# Patient Record
Sex: FEMALE | Race: WHITE | NOT HISPANIC OR LATINO | Employment: OTHER | URBAN - NONMETROPOLITAN AREA
[De-identification: names, ages, dates, MRNs, and addresses within clinical notes are randomized per-mention and may not be internally consistent; named-entity substitution may affect disease eponyms.]

---

## 2023-10-18 DIAGNOSIS — I25.10 CORONARY ARTERY DISEASE INVOLVING NATIVE CORONARY ARTERY OF NATIVE HEART, UNSPECIFIED WHETHER ANGINA PRESENT: ICD-10-CM

## 2023-10-19 PROBLEM — I25.10 CAD (CORONARY ARTERY DISEASE): Status: ACTIVE | Noted: 2023-10-19

## 2023-10-19 PROBLEM — M79.10 MYALGIA: Status: ACTIVE | Noted: 2023-10-19

## 2023-10-19 PROBLEM — I73.9 PVD (PERIPHERAL VASCULAR DISEASE) (CMS-HCC): Status: ACTIVE | Noted: 2023-10-19

## 2023-10-19 PROBLEM — R06.00 DYSPNEA: Status: ACTIVE | Noted: 2023-10-19

## 2023-10-19 PROBLEM — R68.89 ABNORMAL ANKLE BRACHIAL INDEX: Status: ACTIVE | Noted: 2023-10-19

## 2023-10-19 PROBLEM — I10 HYPERTENSION: Status: ACTIVE | Noted: 2023-10-19

## 2023-10-19 PROBLEM — Z86.79 HISTORY OF ISCHEMIC CARDIOMYOPATHY: Status: ACTIVE | Noted: 2023-10-19

## 2023-10-19 PROBLEM — I73.9 CLAUDICATION, INTERMITTENT (CMS-HCC): Status: ACTIVE | Noted: 2023-10-19

## 2023-10-19 PROBLEM — E78.2 MIXED HYPERLIPIDEMIA: Status: ACTIVE | Noted: 2023-10-19

## 2023-10-19 PROBLEM — J44.9 COPD (CHRONIC OBSTRUCTIVE PULMONARY DISEASE) (MULTI): Status: ACTIVE | Noted: 2023-10-19

## 2023-10-19 PROBLEM — I25.2 HISTORY OF ACUTE ANTERIOR WALL MI: Status: ACTIVE | Noted: 2023-10-19

## 2023-10-19 RX ORDER — ATORVASTATIN CALCIUM 80 MG/1
1 TABLET, FILM COATED ORAL NIGHTLY
COMMUNITY
Start: 2021-11-23 | End: 2024-02-20 | Stop reason: SDUPTHER

## 2023-10-19 RX ORDER — SPIRONOLACTONE 25 MG/1
0.5 TABLET ORAL DAILY
COMMUNITY
Start: 2021-11-22 | End: 2023-12-05

## 2023-10-19 RX ORDER — NITROGLYCERIN 0.4 MG/1
0.4 TABLET SUBLINGUAL EVERY 5 MIN PRN
COMMUNITY

## 2023-10-19 RX ORDER — EZETIMIBE 10 MG/1
10 TABLET ORAL DAILY
COMMUNITY
Start: 2023-08-16 | End: 2023-11-21 | Stop reason: SDUPTHER

## 2023-10-19 RX ORDER — NAPROXEN SODIUM 220 MG/1
81 TABLET, FILM COATED ORAL DAILY
COMMUNITY
Start: 2021-11-22

## 2023-10-19 RX ORDER — TIOTROPIUM BROMIDE INHALATION SPRAY 3.12 UG/1
2 SPRAY, METERED RESPIRATORY (INHALATION) DAILY
COMMUNITY
Start: 2023-08-15

## 2023-10-19 RX ORDER — LISINOPRIL 20 MG/1
1 TABLET ORAL DAILY
COMMUNITY
Start: 2021-11-22

## 2023-10-19 RX ORDER — CARVEDILOL 12.5 MG/1
1 TABLET ORAL 2 TIMES DAILY
COMMUNITY
Start: 2021-11-23

## 2023-10-20 RX ORDER — ATORVASTATIN CALCIUM 80 MG/1
80 TABLET, FILM COATED ORAL NIGHTLY
Qty: 90 TABLET | Refills: 3 | Status: SHIPPED | OUTPATIENT
Start: 2023-10-20

## 2023-11-16 DIAGNOSIS — I25.10 CORONARY ARTERY DISEASE INVOLVING NATIVE CORONARY ARTERY OF NATIVE HEART, UNSPECIFIED WHETHER ANGINA PRESENT: ICD-10-CM

## 2023-11-16 DIAGNOSIS — I73.9 PVD (PERIPHERAL VASCULAR DISEASE) (CMS-HCC): ICD-10-CM

## 2023-11-16 DIAGNOSIS — E78.2 MIXED HYPERLIPIDEMIA: ICD-10-CM

## 2023-11-21 RX ORDER — EZETIMIBE 10 MG/1
10 TABLET ORAL DAILY
Qty: 90 TABLET | Refills: 0 | OUTPATIENT
Start: 2023-11-21

## 2023-11-21 RX ORDER — CLOPIDOGREL BISULFATE 75 MG/1
75 TABLET ORAL DAILY
Refills: 0 | OUTPATIENT
Start: 2023-11-21

## 2023-11-30 RX ORDER — EZETIMIBE 10 MG/1
10 TABLET ORAL DAILY
Qty: 90 TABLET | Refills: 3 | Status: SHIPPED | OUTPATIENT
Start: 2023-11-30

## 2023-12-01 DIAGNOSIS — Z86.79 HISTORY OF ISCHEMIC CARDIOMYOPATHY: ICD-10-CM

## 2023-12-05 RX ORDER — SPIRONOLACTONE 25 MG/1
12.5 TABLET ORAL DAILY
Qty: 45 TABLET | Refills: 3 | Status: SHIPPED | OUTPATIENT
Start: 2023-12-05

## 2023-12-12 RX ORDER — CLOPIDOGREL BISULFATE 75 MG/1
75 TABLET ORAL DAILY
Qty: 90 TABLET | Refills: 3 | OUTPATIENT
Start: 2023-12-12

## 2023-12-13 ENCOUNTER — APPOINTMENT (OUTPATIENT)
Dept: CARDIOLOGY | Facility: CLINIC | Age: 68
End: 2023-12-13
Payer: MEDICARE

## 2024-01-17 RX ORDER — FLUTICASONE FUROATE AND VILANTEROL 200; 25 UG/1; UG/1
1 POWDER RESPIRATORY (INHALATION) DAILY
COMMUNITY

## 2024-02-17 LAB
NON-UH HIE CHOL: 127 MG/DL (ref 120–200)
NON-UH HIE HDL: 42 MG/DL
NON-UH HIE LDL DIRECT: 70 MG/DL
NON-UH HIE TRIG: 141 MG/DL
NON-UH HIE VLDL: 28 MG/DL (ref 7–40)

## 2024-02-20 ENCOUNTER — OFFICE VISIT (OUTPATIENT)
Dept: CARDIOLOGY | Facility: CLINIC | Age: 69
End: 2024-02-20
Payer: MEDICARE

## 2024-02-20 VITALS
DIASTOLIC BLOOD PRESSURE: 68 MMHG | BODY MASS INDEX: 23.32 KG/M2 | HEART RATE: 76 BPM | HEIGHT: 65 IN | WEIGHT: 140 LBS | SYSTOLIC BLOOD PRESSURE: 102 MMHG

## 2024-02-20 DIAGNOSIS — J44.9 CHRONIC OBSTRUCTIVE PULMONARY DISEASE, UNSPECIFIED COPD TYPE (MULTI): ICD-10-CM

## 2024-02-20 DIAGNOSIS — I73.9 PVD (PERIPHERAL VASCULAR DISEASE) (CMS-HCC): ICD-10-CM

## 2024-02-20 DIAGNOSIS — E78.2 MIXED HYPERLIPIDEMIA: ICD-10-CM

## 2024-02-20 DIAGNOSIS — Z98.61 S/P PTCA (PERCUTANEOUS TRANSLUMINAL CORONARY ANGIOPLASTY): ICD-10-CM

## 2024-02-20 DIAGNOSIS — Z86.79 HISTORY OF ISCHEMIC CARDIOMYOPATHY: ICD-10-CM

## 2024-02-20 DIAGNOSIS — I25.2 HISTORY OF ACUTE ANTERIOR WALL MI: ICD-10-CM

## 2024-02-20 DIAGNOSIS — I10 PRIMARY HYPERTENSION: ICD-10-CM

## 2024-02-20 DIAGNOSIS — I25.10 CORONARY ARTERY DISEASE INVOLVING NATIVE CORONARY ARTERY OF NATIVE HEART WITHOUT ANGINA PECTORIS: ICD-10-CM

## 2024-02-20 PROCEDURE — 1159F MED LIST DOCD IN RCRD: CPT | Performed by: INTERNAL MEDICINE

## 2024-02-20 PROCEDURE — 3074F SYST BP LT 130 MM HG: CPT | Performed by: INTERNAL MEDICINE

## 2024-02-20 PROCEDURE — 1036F TOBACCO NON-USER: CPT | Performed by: INTERNAL MEDICINE

## 2024-02-20 PROCEDURE — 99213 OFFICE O/P EST LOW 20 MIN: CPT | Performed by: INTERNAL MEDICINE

## 2024-02-20 PROCEDURE — 3078F DIAST BP <80 MM HG: CPT | Performed by: INTERNAL MEDICINE

## 2024-02-20 PROCEDURE — 1160F RVW MEDS BY RX/DR IN RCRD: CPT | Performed by: INTERNAL MEDICINE

## 2024-02-20 ASSESSMENT — ENCOUNTER SYMPTOMS: LIGHT-HEADEDNESS: 1

## 2024-02-20 NOTE — PROGRESS NOTES
"Subjective   Steffanie Duvall is a 68 y.o. female       Chief Complaint    Annual Exam          68-year-old female returns for annual follow-up she is doing well she denies any cardiovascular complaints, hospitalizations, nitrate usage.  She has sporadic claudication symptoms she has no anginal symptomatology and no nitrate usage.    She has a prior history of MI with revascularization of the proximal mid/distal left anterior descending artery with 2 drug-eluting stents at the time with moderately severe LV dysfunction in May 2017, and subsequent right SFA drug-eluting balloon intervention performed by myself.    She remains on appropriate guideline directed medical therapies, she has not had any reassessment of her LV function but is clinically NYHA class I-2/C.    Recommendations, obtain appropriate laboratories and review, follow-up in 1 year         Review of Systems   Cardiovascular:  Positive for chest pain.   Neurological:  Positive for light-headedness.   All other systems reviewed and are negative.           Vitals:    02/20/24 1128 02/20/24 1133   BP: 106/66 102/68   BP Location: Left arm Left arm   Patient Position: Sitting Standing   Pulse: 76 76   Weight: 63.5 kg (140 lb)    Height: 1.651 m (5' 5\")         Objective   Physical Exam  Constitutional:       Appearance: Normal appearance. She is normal weight.   HENT:      Nose: Nose normal.   Neck:      Vascular: No carotid bruit.   Cardiovascular:      Rate and Rhythm: Normal rate.      Pulses: Normal pulses.      Heart sounds: Normal heart sounds.   Pulmonary:      Effort: Pulmonary effort is normal.   Abdominal:      General: Bowel sounds are normal.      Palpations: Abdomen is soft.   Genitourinary:     Rectum: Normal.   Musculoskeletal:         General: Normal range of motion.      Cervical back: Normal range of motion.      Right lower leg: No edema.      Left lower leg: No edema.   Skin:     General: Skin is warm and dry.   Neurological:      " General: No focal deficit present.      Mental Status: She is alert.   Psychiatric:         Mood and Affect: Mood normal.         Behavior: Behavior normal.         Thought Content: Thought content normal.         Judgment: Judgment normal.         Allergies  Patient has no known allergies.     Current Medications    Current Outpatient Medications:     aspirin 81 mg chewable tablet, Chew 1 tablet (81 mg) once daily., Disp: , Rfl:     atorvastatin (Lipitor) 80 mg tablet, TAKE 1 TABLET AT BEDTIME, Disp: 90 tablet, Rfl: 3    carvedilol (Coreg) 12.5 mg tablet, Take 1 tablet (12.5 mg) by mouth 2 times a day., Disp: , Rfl:     ezetimibe (Zetia) 10 mg tablet, Take 1 tablet (10 mg) by mouth once daily., Disp: 90 tablet, Rfl: 3    fluticasone furoate-vilanteroL (Breo Ellipta) 200-25 mcg/dose inhaler, Inhale 1 puff once daily., Disp: , Rfl:     lisinopril 20 mg tablet, Take 1 tablet (20 mg) by mouth once daily., Disp: , Rfl:     nitroglycerin (Nitrostat) 0.4 mg SL tablet, Place 1 tablet (0.4 mg) under the tongue every 5 minutes if needed for chest pain., Disp: , Rfl:     Spiriva Respimat 2.5 mcg/actuation inhaler, Inhale 2 puffs once daily., Disp: , Rfl:     spironolactone (Aldactone) 25 mg tablet, TAKE 1/2 TABLET EVERY DAY, Disp: 45 tablet, Rfl: 3                     Assessment/Plan   1. Coronary artery disease involving native coronary artery of native heart without angina pectoris        2. S/P PTCA (percutaneous transluminal coronary angioplasty)        3. PVD (peripheral vascular disease) (CMS/HCC)        4. Mixed hyperlipidemia        5. Primary hypertension        6. History of acute anterior wall MI        7. History of ischemic cardiomyopathy        8. Chronic obstructive pulmonary disease, unspecified COPD type (CMS/HCC)                 Scribe Attestation  By signing my name below, Reyna VILLEGAS LPN  , Scribe   attest that this documentation has been prepared under the direction and in the presence of David QUIJANO  DO [place].     Provider Attestation - Scribe documentation    All medical record entries made by the Scribe were at my direction and personally dictated by me. I have reviewed the chart and agree that the record accurately reflects my personal performance of the history, physical exam, discussion and plan.

## 2024-09-25 DIAGNOSIS — Z86.79 HISTORY OF ISCHEMIC CARDIOMYOPATHY: ICD-10-CM

## 2024-09-26 DIAGNOSIS — I10 HYPERTENSION, UNSPECIFIED TYPE: ICD-10-CM

## 2024-09-26 RX ORDER — CARVEDILOL 12.5 MG/1
12.5 TABLET ORAL 2 TIMES DAILY
Qty: 180 TABLET | Refills: 3 | Status: CANCELLED | OUTPATIENT
Start: 2024-09-26 | End: 2025-09-26

## 2024-09-26 RX ORDER — SPIRONOLACTONE 25 MG/1
12.5 TABLET ORAL DAILY
Qty: 45 TABLET | Refills: 3 | Status: SHIPPED | OUTPATIENT
Start: 2024-09-26

## 2024-09-27 RX ORDER — LISINOPRIL 20 MG/1
20 TABLET ORAL DAILY
Qty: 90 TABLET | Refills: 3 | Status: SHIPPED | OUTPATIENT
Start: 2024-09-27 | End: 2025-09-27

## 2024-10-19 DIAGNOSIS — I25.10 CORONARY ARTERY DISEASE INVOLVING NATIVE CORONARY ARTERY OF NATIVE HEART, UNSPECIFIED WHETHER ANGINA PRESENT: ICD-10-CM

## 2024-10-19 DIAGNOSIS — E78.2 MIXED HYPERLIPIDEMIA: ICD-10-CM

## 2024-10-21 RX ORDER — ATORVASTATIN CALCIUM 80 MG/1
80 TABLET, FILM COATED ORAL NIGHTLY
Qty: 90 TABLET | Refills: 3 | Status: SHIPPED | OUTPATIENT
Start: 2024-10-21 | End: 2025-10-21

## 2024-12-20 DIAGNOSIS — E78.2 MIXED HYPERLIPIDEMIA: ICD-10-CM

## 2024-12-20 RX ORDER — EZETIMIBE 10 MG/1
10 TABLET ORAL DAILY
Qty: 90 TABLET | Refills: 3 | Status: SHIPPED | OUTPATIENT
Start: 2024-12-20

## 2025-02-20 ENCOUNTER — APPOINTMENT (OUTPATIENT)
Dept: CARDIOLOGY | Facility: CLINIC | Age: 70
End: 2025-02-20
Payer: MEDICARE

## 2025-02-25 ENCOUNTER — TELEPHONE (OUTPATIENT)
Dept: CARDIOLOGY | Facility: CLINIC | Age: 70
End: 2025-02-25
Payer: COMMERCIAL

## 2025-02-25 DIAGNOSIS — I10 PRIMARY HYPERTENSION: ICD-10-CM

## 2025-02-25 DIAGNOSIS — E78.2 MIXED HYPERLIPIDEMIA: ICD-10-CM

## 2025-02-25 NOTE — TELEPHONE ENCOUNTER
Labs ordered at last OV . POV 2025. Labs generated and will need faxed to [facility] once signed. Please advise.    To Dr. David Jarrell, DO

## 2025-02-26 LAB
NON-UH HIE ALANINE AMINOTRANSFERASE:CCNC:PT:SER/PLAS:QN:NO ADDITION OF P-5': 12 INT._UNIT/L (ref 6–46)
NON-UH HIE ANION GAP:SCNC:PT:SER/PLAS:QN:: 10 MEQ/L (ref 6–16)
NON-UH HIE ASPARTATE AMINOTRANSFERASE:CCNC:PT:SER/PLAS:QN:: 15 INT._UNIT/L (ref 5–43)
NON-UH HIE CALCIUM:MCNC:PT:SER/PLAS:QN:: 9.1 MG/DL (ref 8.9–11.1)
NON-UH HIE CARBON DIOXIDE:SCNC:PT:SER/PLAS:QN:: 30 MMOL/L (ref 21–31)
NON-UH HIE CHLORIDE:SCNC:PT:SER/PLAS:QN:: 104 MMOL/L (ref 101–111)
NON-UH HIE CHOLESTEROL.IN HDL:MCNC:PT:SER/PLAS:QN:: 31 MG/DL
NON-UH HIE CHOLESTEROL.IN LDL:MCNC:PT:SER/PLAS:QN:: 34 MG/DL
NON-UH HIE CHOLESTEROL.IN VLDL:MCNC:PT:SER/PLAS:QN:CALCULATED: 22 MG/DL (ref 7–40)
NON-UH HIE CHOLESTEROL:MCNC:PT:SER/PLAS:QN:: 80 MG/DL (ref 120–200)
NON-UH HIE CREATININE:MCNC:PT:SER/PLAS:QN:: 0.4 MG/DL (ref 0.5–1.3)
NON-UH HIE EGFR: 107 ML/MIN/1.73 M2
NON-UH HIE GLUCOSE:MCNC:PT:SER/PLAS:QN:: 113 MG/DL (ref 55–199)
NON-UH HIE POTASSIUM:SCNC:PT:SER/PLAS:QN:: 3.5 MMOL/L (ref 3.5–5.3)
NON-UH HIE SODIUM:SCNC:PT:SER/PLAS:QN:: 140 MMOL/L (ref 135–145)
NON-UH HIE TRIGLYCERIDE:MCNC:PT:SER/PLAS:QN:: 111 MG/DL
NON-UH HIE UREA NITROGEN/CREATININE:MRTO:PT:SER/PLAS:QN:: 48 NO UNITS (ref 10–20)
NON-UH HIE UREA NITROGEN:MCNC:PT:SER/PLAS:QN:: 19 MG/DL (ref 5–21)

## 2025-02-27 ENCOUNTER — APPOINTMENT (OUTPATIENT)
Dept: CARDIOLOGY | Facility: CLINIC | Age: 70
End: 2025-02-27
Payer: MEDICARE

## 2025-02-27 VITALS
BODY MASS INDEX: 19.26 KG/M2 | HEIGHT: 65 IN | HEART RATE: 100 BPM | SYSTOLIC BLOOD PRESSURE: 105 MMHG | WEIGHT: 115.6 LBS | DIASTOLIC BLOOD PRESSURE: 60 MMHG

## 2025-02-27 DIAGNOSIS — I25.2 HISTORY OF ACUTE ANTERIOR WALL MI: ICD-10-CM

## 2025-02-27 DIAGNOSIS — Z98.61 S/P PTCA (PERCUTANEOUS TRANSLUMINAL CORONARY ANGIOPLASTY): ICD-10-CM

## 2025-02-27 DIAGNOSIS — I10 PRIMARY HYPERTENSION: ICD-10-CM

## 2025-02-27 DIAGNOSIS — Z87.891 FORMER SMOKER: ICD-10-CM

## 2025-02-27 DIAGNOSIS — I25.10 CORONARY ARTERY DISEASE INVOLVING NATIVE CORONARY ARTERY OF NATIVE HEART WITHOUT ANGINA PECTORIS: ICD-10-CM

## 2025-02-27 DIAGNOSIS — J44.9 CHRONIC OBSTRUCTIVE PULMONARY DISEASE, UNSPECIFIED COPD TYPE (MULTI): ICD-10-CM

## 2025-02-27 DIAGNOSIS — E78.2 MIXED HYPERLIPIDEMIA: ICD-10-CM

## 2025-02-27 DIAGNOSIS — Z86.79 HISTORY OF ISCHEMIC CARDIOMYOPATHY: ICD-10-CM

## 2025-02-27 DIAGNOSIS — I73.9 PVD (PERIPHERAL VASCULAR DISEASE) (CMS-HCC): ICD-10-CM

## 2025-02-27 DIAGNOSIS — R00.0 SINUS TACHYCARDIA: ICD-10-CM

## 2025-02-27 PROCEDURE — 1160F RVW MEDS BY RX/DR IN RCRD: CPT | Performed by: INTERNAL MEDICINE

## 2025-02-27 PROCEDURE — 3078F DIAST BP <80 MM HG: CPT | Performed by: INTERNAL MEDICINE

## 2025-02-27 PROCEDURE — 3074F SYST BP LT 130 MM HG: CPT | Performed by: INTERNAL MEDICINE

## 2025-02-27 PROCEDURE — 1159F MED LIST DOCD IN RCRD: CPT | Performed by: INTERNAL MEDICINE

## 2025-02-27 PROCEDURE — 1036F TOBACCO NON-USER: CPT | Performed by: INTERNAL MEDICINE

## 2025-02-27 PROCEDURE — 3008F BODY MASS INDEX DOCD: CPT | Performed by: INTERNAL MEDICINE

## 2025-02-27 PROCEDURE — 99213 OFFICE O/P EST LOW 20 MIN: CPT | Performed by: INTERNAL MEDICINE

## 2025-02-27 PROCEDURE — 93000 ELECTROCARDIOGRAM COMPLETE: CPT | Performed by: INTERNAL MEDICINE

## 2025-02-27 RX ORDER — TIRZEPATIDE 5 MG/.5ML
1 INJECTION, SOLUTION SUBCUTANEOUS
COMMUNITY
Start: 2024-10-18

## 2025-02-27 RX ORDER — METFORMIN HYDROCHLORIDE 500 MG/1
6.25 TABLET ORAL
COMMUNITY
Start: 2025-02-16

## 2025-02-27 RX ORDER — TIRZEPATIDE 2.5 MG/.5ML
1 INJECTION, SOLUTION SUBCUTANEOUS
COMMUNITY
Start: 2024-09-18

## 2025-02-27 ASSESSMENT — ENCOUNTER SYMPTOMS: DIZZINESS: 1

## 2025-02-27 NOTE — PROGRESS NOTES
"Subjective   Steffanie Duvall is a 69 y.o. female       Chief Complaint    Annual Exam          69-year-old female returns for follow-up she is doing well she denies any cardiovascular events, complaints or nitrate usage or repeat hospitalizations, edema, syncope.  She is lost approximately 25 pounds in treatment for diabetes on Mounjaro therapy    She has a prior history of MI with revascularization of the proximal mid/distal left anterior descending artery with 2 drug-eluting stents at the time with moderately severe LV dysfunction in May 2017, and subsequent right SFA drug-eluting balloon intervention performed by myself.  She has no complaints of claudication.  She is no longer smoking.    ECG today reveals sinus rhythm at a rate of 93 slightly tachycardic with no ischemic changes and otherwise normal    Recommendations: Continue current therapies follow-up in 1 year         Review of Systems   Neurological:  Positive for dizziness.            Vitals:    02/27/25 1136   BP: 105/60   BP Location: Left arm   Patient Position: Sitting   Pulse: 100   Weight: 52.4 kg (115 lb 9.6 oz)   Height: 1.651 m (5' 5\")      EKG done in office today     Objective   Physical Exam  Constitutional:       Appearance: Normal appearance.   HENT:      Nose: Nose normal.   Neck:      Vascular: No carotid bruit.   Cardiovascular:      Rate and Rhythm: Tachycardia present.      Pulses: Normal pulses.      Heart sounds: Normal heart sounds.   Pulmonary:      Effort: Pulmonary effort is normal.   Abdominal:      General: Bowel sounds are normal.      Palpations: Abdomen is soft.   Musculoskeletal:         General: Normal range of motion.      Cervical back: Normal range of motion.      Right lower leg: No edema.      Left lower leg: No edema.   Skin:     General: Skin is warm and dry.   Neurological:      General: No focal deficit present.      Mental Status: She is alert.   Psychiatric:         Mood and Affect: Mood normal.         " Behavior: Behavior normal.         Thought Content: Thought content normal.         Judgment: Judgment normal.         Allergies  Patient has no known allergies.     Current Medications    Current Outpatient Medications:     aspirin 81 mg chewable tablet, Chew 1 tablet (81 mg) once daily., Disp: , Rfl:     atorvastatin (Lipitor) 80 mg tablet, Take 1 tablet (80 mg) by mouth once daily at bedtime., Disp: 90 tablet, Rfl: 3    carvedilol (Coreg) 12.5 mg tablet, Take 1 tablet (12.5 mg) by mouth 2 times a day., Disp: , Rfl:     empagliflozin (Jardiance) 10 mg, Take 1 tablet (10 mg) by mouth once daily., Disp: , Rfl:     ezetimibe (Zetia) 10 mg tablet, TAKE 1 TABLET ONE TIME DAILY, Disp: 90 tablet, Rfl: 3    fluticasone furoate-vilanteroL (Breo Ellipta) 200-25 mcg/dose inhaler, Inhale 1 puff once daily., Disp: , Rfl:     lisinopril 20 mg tablet, Take 1 tablet (20 mg) by mouth once daily., Disp: 90 tablet, Rfl: 3    metFORMIN (Glucophage) 500 mg tablet, Take 6.25 mg by mouth 2 times a day after meals., Disp: , Rfl:     Mounjaro 2.5 mg/0.5 mL pen injector, Inject 2.5 mg under the skin every 7 days., Disp: , Rfl:     Mounjaro 5 mg/0.5 mL pen injector, Inject 1 Syringe under the skin every 7 days., Disp: , Rfl:     nitroglycerin (Nitrostat) 0.4 mg SL tablet, Place 1 tablet (0.4 mg) under the tongue every 5 minutes if needed for chest pain., Disp: , Rfl:     Spiriva Respimat 2.5 mcg/actuation inhaler, Inhale 2 puffs once daily., Disp: , Rfl:     spironolactone (Aldactone) 25 mg tablet, TAKE 1/2 TABLET EVERY DAY, Disp: 45 tablet, Rfl: 3                     Assessment/Plan   1. Coronary artery disease involving native coronary artery of native heart without angina pectoris  Follow Up In Cardiology      2. History of acute anterior wall MI        3. S/P PTCA (percutaneous transluminal coronary angioplasty)        4. History of ischemic cardiomyopathy        5. Mixed hyperlipidemia        6. Primary hypertension        7. Sinus  tachycardia        8. PVD (peripheral vascular disease) (CMS-HCC)        9. Chronic obstructive pulmonary disease, unspecified COPD type (Multi)        10. Former smoker                 Scribe Attestation  By signing my name below, I, Eugenia Canseco LPNibyeni   attest that this documentation has been prepared under the direction and in the presence of David Jarrell DO.     Provider Attestation - Scribe documentation    All medical record entries made by the Scribe were at my direction and personally dictated by me. I have reviewed the chart and agree that the record accurately reflects my personal performance of the history, physical exam, discussion and plan.

## 2025-02-27 NOTE — LETTER
"February 27, 2025     Chyna Nuñez MD  [id number]  [facility] [id number]    Patient: Steffanie Duvall   YOB: 1955   Date of Visit: 2/27/2025       Dear Dr. Chyna Nuñez MD:    Thank you for referring Steffanie Duvall to me for evaluation. Below are my notes for this consultation.  If you have questions, please do not hesitate to call me. I look forward to following your patient along with you.       Sincerely,     David Jarrell, DO      CC: No Recipients  ______________________________________________________________________________________    Subjective   Steffanie Duvall is a 69 y.o. female       Chief Complaint    Annual Exam          69-year-old female returns for follow-up she is doing well she denies any cardiovascular events, complaints or nitrate usage or repeat hospitalizations, edema, syncope.  She is lost approximately 25 pounds in treatment for diabetes on Mounjaro therapy    She has a prior history of MI with revascularization of the proximal mid/distal left anterior descending artery with 2 drug-eluting stents at the time with moderately severe LV dysfunction in May 2017, and subsequent right SFA drug-eluting balloon intervention performed by myself.  She has no complaints of claudication.  She is no longer smoking.    ECG today reveals sinus rhythm at a rate of 93 slightly tachycardic with no ischemic changes and otherwise normal    Recommendations: Continue current therapies follow-up in 1 year         Review of Systems   Neurological:  Positive for dizziness.            Vitals:    02/27/25 1136   BP: 105/60   BP Location: Left arm   Patient Position: Sitting   Pulse: 100   Weight: 52.4 kg (115 lb 9.6 oz)   Height: 1.651 m (5' 5\")      EKG done in office today     Objective   Physical Exam  Constitutional:       Appearance: Normal appearance.   HENT:      Nose: Nose normal.   Neck:      Vascular: No carotid bruit.   Cardiovascular:      Rate and Rhythm: Tachycardia present.     "  Pulses: Normal pulses.      Heart sounds: Normal heart sounds.   Pulmonary:      Effort: Pulmonary effort is normal.   Abdominal:      General: Bowel sounds are normal.      Palpations: Abdomen is soft.   Musculoskeletal:         General: Normal range of motion.      Cervical back: Normal range of motion.      Right lower leg: No edema.      Left lower leg: No edema.   Skin:     General: Skin is warm and dry.   Neurological:      General: No focal deficit present.      Mental Status: She is alert.   Psychiatric:         Mood and Affect: Mood normal.         Behavior: Behavior normal.         Thought Content: Thought content normal.         Judgment: Judgment normal.         Allergies  Patient has no known allergies.     Current Medications    Current Outpatient Medications:   •  aspirin 81 mg chewable tablet, Chew 1 tablet (81 mg) once daily., Disp: , Rfl:   •  atorvastatin (Lipitor) 80 mg tablet, Take 1 tablet (80 mg) by mouth once daily at bedtime., Disp: 90 tablet, Rfl: 3  •  carvedilol (Coreg) 12.5 mg tablet, Take 1 tablet (12.5 mg) by mouth 2 times a day., Disp: , Rfl:   •  empagliflozin (Jardiance) 10 mg, Take 1 tablet (10 mg) by mouth once daily., Disp: , Rfl:   •  ezetimibe (Zetia) 10 mg tablet, TAKE 1 TABLET ONE TIME DAILY, Disp: 90 tablet, Rfl: 3  •  fluticasone furoate-vilanteroL (Breo Ellipta) 200-25 mcg/dose inhaler, Inhale 1 puff once daily., Disp: , Rfl:   •  lisinopril 20 mg tablet, Take 1 tablet (20 mg) by mouth once daily., Disp: 90 tablet, Rfl: 3  •  metFORMIN (Glucophage) 500 mg tablet, Take 6.25 mg by mouth 2 times a day after meals., Disp: , Rfl:   •  Mounjaro 2.5 mg/0.5 mL pen injector, Inject 2.5 mg under the skin every 7 days., Disp: , Rfl:   •  Mounjaro 5 mg/0.5 mL pen injector, Inject 1 Syringe under the skin every 7 days., Disp: , Rfl:   •  nitroglycerin (Nitrostat) 0.4 mg SL tablet, Place 1 tablet (0.4 mg) under the tongue every 5 minutes if needed for chest pain., Disp: , Rfl:   •   Spiriva Respimat 2.5 mcg/actuation inhaler, Inhale 2 puffs once daily., Disp: , Rfl:   •  spironolactone (Aldactone) 25 mg tablet, TAKE 1/2 TABLET EVERY DAY, Disp: 45 tablet, Rfl: 3                     Assessment/Plan   1. Coronary artery disease involving native coronary artery of native heart without angina pectoris  Follow Up In Cardiology      2. History of acute anterior wall MI        3. S/P PTCA (percutaneous transluminal coronary angioplasty)        4. History of ischemic cardiomyopathy        5. Mixed hyperlipidemia        6. Primary hypertension        7. Sinus tachycardia        8. PVD (peripheral vascular disease) (CMS-[facility])        9. Chronic obstructive pulmonary disease, unspecified COPD type (Multi)        10. Former smoker                 Scribe Attestation  By signing my name below, I, Eugenia Canseco LPNibe   attest that this documentation has been prepared under the direction and in the presence of David Jarrell DO.     Provider Attestation - Scribe documentation    All medical record entries made by the Scribe were at my direction and personally dictated by me. I have reviewed the chart and agree that the record accurately reflects my personal performance of the history, physical exam, discussion and plan.

## 2025-03-28 ENCOUNTER — TELEPHONE (OUTPATIENT)
Dept: CARDIOLOGY | Facility: CLINIC | Age: 70
End: 2025-03-28
Payer: COMMERCIAL

## 2025-03-28 NOTE — TELEPHONE ENCOUNTER
----- Message from David Jarrell sent at 3/27/2025 12:30 PM EDT -----  No new orders. Please call patient with normal result.

## 2025-03-28 NOTE — TELEPHONE ENCOUNTER
Result Communication    Resulted Orders   [facility] H[facility] BMP   Result Value Ref Range    [facility] HIE GLUCOSE:MCNC:PT:SER/PLAS:QN: 113 55 - 199 mg/dL    [facility] HIE UREA NITROGEN:MCNC:PT:SER/PLAS:QN: 19 5 - 21 mg/dL    [facility] H[facility] CREATININE:MCNC:PT:SER/PLAS:QN: 0.4 (L) 0.5 - 1.3 mg/dL    [facility] HIE UREA NITROGEN/CREATININE:MRTO:PT:SER/PLAS:QN: 48 (H) 10 - 20 No Units    [facility] HIE CALCIUM:MCNC:PT:SER/PLAS:QN: 9.1 8.9 - 11.1 mg/dL    [facility] HIE SODIUM:SCNC:PT:SER/PLAS:QN: 140 135 - 145 mmol/L    [facility] HIE POTASSIUM:SCNC:PT:SER/PLAS:QN: 3.5 3.5 - 5.3 mmol/L    [facility] HIE CHLORIDE:SCNC:PT:SER/PLAS:QN: 104 101 - 111 mmol/L    [facility] HIE CARBON DIOXIDE:SCNC:PT:SER/PLAS:QN: 30 21 - 31 mmol/L    [facility] HIE ANION GAP:SCNC:PT:SER/PLAS:QN: 10 6 - 16 mEq/L   [facility] H[facility] ALT   Result Value Ref Range    [facility] HIE ALANINE AMINOTRANSFERASE:CCNC:PT:SER/PLAS:QN:NO ADDITION OF P-5' 12 6 - 46 Int._Unit/L   [facility] H[facility] AST   Result Value Ref Range    [facility] HIE ASPARTATE AMINOTRANSFERASE:CCNC:PT:SER/PLAS:QN: 15 5 - 43 Int._Unit/L   [facility] HIE Lipid Panel   Result Value Ref Range    [facility] HIE CHOLESTEROL:MCNC:PT:SER/PLAS:QN: 80 (L) 120 - 200 mg/dL    [facility] HIE CHOLESTEROL.IN HDL:MCNC:PT:SER/PLAS:QN: 31 mg/dL      Comment:      '>= 60 LOW RISK''<= 40 HIGH RISK'    [facility] HIE CHOLESTEROL.IN LDL:MCNC:PT:SER/PLAS:QN: 34 <=129 mg/dL    [facility] HIE TRIGLYCERIDE:MCNC:PT:SER/PLAS:QN: 111 <=149 mg/dL    [facility] HIE CHOLESTEROL.IN VLDL:MCNC:PT:SER/PLAS:QN:CALCULATED 22 7 - 40 mg/dL   [facility] HIE eGFR   Result Value Ref Range    [facility] HIE EGFR 107 >=59 mL/min/1.73 m2       1:30 PM      Results were successfully communicated with the patient and they acknowledged their understanding.

## 2026-03-03 ENCOUNTER — APPOINTMENT (OUTPATIENT)
Dept: CARDIOLOGY | Facility: CLINIC | Age: 71
End: 2026-03-03
Payer: COMMERCIAL